# Patient Record
Sex: FEMALE | Race: WHITE
[De-identification: names, ages, dates, MRNs, and addresses within clinical notes are randomized per-mention and may not be internally consistent; named-entity substitution may affect disease eponyms.]

---

## 2019-03-05 ENCOUNTER — HOSPITAL ENCOUNTER (EMERGENCY)
Dept: HOSPITAL 97 - ER | Age: 34
Discharge: HOME | End: 2019-03-05
Payer: COMMERCIAL

## 2019-03-05 DIAGNOSIS — K59.00: ICD-10-CM

## 2019-03-05 DIAGNOSIS — N20.0: Primary | ICD-10-CM

## 2019-03-05 DIAGNOSIS — F17.210: ICD-10-CM

## 2019-03-05 LAB — UA COMPLETE W REFLEX CULTURE PNL UR: (no result)

## 2019-03-05 PROCEDURE — 96372 THER/PROPH/DIAG INJ SC/IM: CPT

## 2019-03-05 PROCEDURE — 76377 3D RENDER W/INTRP POSTPROCES: CPT

## 2019-03-05 PROCEDURE — 81015 MICROSCOPIC EXAM OF URINE: CPT

## 2019-03-05 PROCEDURE — 74176 CT ABD & PELVIS W/O CONTRAST: CPT

## 2019-03-05 PROCEDURE — 87088 URINE BACTERIA CULTURE: CPT

## 2019-03-05 PROCEDURE — 81003 URINALYSIS AUTO W/O SCOPE: CPT

## 2019-03-05 PROCEDURE — 99284 EMERGENCY DEPT VISIT MOD MDM: CPT

## 2019-03-05 PROCEDURE — 81025 URINE PREGNANCY TEST: CPT

## 2019-03-05 PROCEDURE — 87086 URINE CULTURE/COLONY COUNT: CPT

## 2019-03-05 NOTE — ER
Nurse's Notes                                                                                     

 [facility]                                                                

Name: Leticia Clemons                                                                               

Age: 33 yrs                                                                                       

Sex: Female                                                                                       

: 1985                                                                                   

MRN: [id number]                                                                                   

Arrival Date: 2019                                                                          

Time: 12:04                                                                                       

Account#: [id number]                                                                            

Bed 14                                                                                            

Private MD: Unknown, Unknown                                                                      

Diagnosis: Calculus of kidney;Constipation                                                        

                                                                                                  

Presentation:                                                                                     

                                                                                             

12:07 Presenting complaint: Patient states: burning with urination both before and after for  ch  

      the past 4-5 days. I tried AZO but it didn't help. Transition of care: patient was not      

      received from another setting of care. Onset of symptoms was 2018. Risk            

      Assessment: Do you want to hurt yourself or someone else? Patient reports no desire to      

      harm self or others. Initial Sepsis Screen: Does the patient meet any 2 criteria? No.       

      Patient's initial sepsis screen is negative. Does the patient have a suspected source       

      of infection? No. Patient's initial sepsis screen is negative. Care prior to arrival:       

      None.                                                                                       

12:07 Method Of Arrival: Ambulatory                                                             

12:07 Acuity: YASMIN 3                                                                           ch  

                                                                                                  

OB/GYN:                                                                                           

12:09 [facility] 2019                                                                              ch  

                                                                                                  

Historical:                                                                                       

- Allergies:                                                                                      

12:09 No Known Allergies;                                                                     ch  

- Home Meds:                                                                                      

12:09 None [Active];                                                                          ch  

- PMHx:                                                                                           

12:09 None;                                                                                   ch  

- PSHx:                                                                                           

12:09 None;                                                                                   ch  

                                                                                                  

- Immunization history:: Adult Immunizations up to date, Flu vaccine is up to date.               

- Social history:: Smoking status: Patient uses tobacco products, smokes one pack                 

  cigarettes per day. Patient/guardian denies using alcohol, street drugs.                        

- Ebola Screening: : Patient negative for fever greater than or equal to 101.5 degrees            

  Fahrenheit, and additional compatible Ebola Virus Disease symptoms Patient denies               

  exposure to infectious person Patient denies travel to an Ebola-affected area in the            

  21 days before illness onset No symptoms or risks identified at this time.                      

                                                                                                  

                                                                                                  

Screenin:15 Abuse screen: Denies threats or abuse. Nutritional screening: No deficits noted.        rb1 

      Tuberculosis screening: No symptoms or risk factors identified. Fall Risk None              

      identified.                                                                                 

                                                                                                  

Assessment:                                                                                       

12:15 General: Appears in no apparent distress. comfortable, slender, Behavior is calm,       rb1 

      cooperative. Pain: Complains of pain in left lower quadrant Pain currently is 9 out of      

      10 on a pain scale. Pain began 4-5 days ago. Neuro: Level of Consciousness is awake,        

      alert, obeys commands, Oriented to person, place, time, situation. Cardiovascular:          

      Capillary refill < 3 seconds is brisk in bilateral fingers. Respiratory: Airway is          

      patent Respiratory effort is even, unlabored, Respiratory pattern is regular,               

      symmetrical. GI: No signs and/or symptoms were reported involving the gastrointestinal      

      system. : Reports burning with urination, since x 4-5 days. Derm: Skin is pink, warm      

      \T\ dry.                                                                                    

13:15 Reassessment: Patient appears in no apparent distress at this time. No changes from     rb1 

      previously documented assessment.                                                           

13:55 Reassessment: Pt. requested pain medication; provider notified. Received order to       rb1 

      Toradol 60 mg IM x once.                                                                    

14:15 Reassessment: Patient appears in no apparent distress at this time. Patient and/or      rb1 

      family updated on plan of care and expected duration. Pain level reassessed. Patient is     

      alert, oriented x 3, equal unlabored respirations, skin warm/dry/pink.                      

15:05 Reassessment: Patient appears in no apparent distress at this time. Patient and/or      rb1 

      family updated on plan of care and expected duration. Pain level reassessed. Patient is     

      alert, oriented x 3, equal unlabored respirations, skin warm/dry/pink. Pain 7/10.           

                                                                                                  

Vital Signs:                                                                                      

12:09  / 97; Pulse 94; Resp 18; Temp 98.3; Pulse Ox 100% on R/A; Weight 45.36 kg;         

      Height 5 ft. 3 in. (160.02 cm); Pain 8/10;                                                  

13:30  / 86; Pulse 93; Resp 16; Pulse Ox 100% on R/A; Pain 9/10;                        rb1 

14:00  / 88; Pulse 86; Resp 16; Pulse Ox 100% ;                                         rb1 

15:00  / 90; Pulse 83; Resp 15; Pulse Ox 100% on R/A;                                   rb1 

12:09 Body Mass Index 17.71 (45.36 kg, 160.02 cm)                                               

                                                                                                  

ED Course:                                                                                        

12:04 Patient arrived in ED.                                                                  ag5 

12:04 Unknown, Unknown is Private Physician.                                                  ag5 

12:05 Valerie Lopez FNP-C is [facility].                                                        kb  

12:06 Patrick Ansari MD is Attending Physician.                                                kb  

12:08 Triage completed.                                                                         

12:15 Patient has correct armband on for positive identification. Bed in low position. Call   rb1 

      light in reach. Side rails up X 1. Pulse ox on. NIBP on. Warm blanket given.                

12:15 Arm band placed on right wrist.                                                         rb1 

12:52 Clementina Sanon, RN is Primary Nurse.                                                   rb1 

14:27 CT Stone Protocol In Process Unspecified.                                               EDMS

15:29 No provider procedures requiring assistance completed. Patient did not have IV access   rb1 

      during this emergency room visit.                                                           

                                                                                                  

Administered Medications:                                                                         

14:05 Drug: TORadol 60 mg Route: IM; Site: right gluteus;                                     rb1 

15:03 Follow up: Response: No adverse reaction; Pain is decreased; 7/10                       rb1 

                                                                                                  

                                                                                                  

Intake:                                                                                           

                                                                                                  

Outcome:                                                                                          

15:20 Discharge ordered by MD.                                                                kb  

15:29 Discharged to home ambulatory, with significant other.                                  rb1 

15:29 Condition: stable                                                                           

15:29 Discharge instructions given to patient, Instructed on discharge instructions, follow       

      up and referral plans. medication usage, Demonstrated understanding of instructions,        

      follow-up care, medications, Prescriptions given X 1.                                       

15:30 Patient left the ED.                                                                    rb1 

                                                                                                  

Signatures:                                                                                       

Dispatcher [employer]                           EDMS                                                 

Valerie Lopez, FNP-C                 FNP-Natalie Ba, RN                  RN                                                      

Clementina Sanon, RN                     RN   rb1                                                  

Tamar Lemus                                ag5                                                  

                                                                                                  

**************************************************************************************************

## 2019-03-05 NOTE — EDPHYS
Physician Documentation                                                                           

 [facility]                                                                

Name: Leticia Clemons                                                                               

Age: 33 yrs                                                                                       

Sex: Female                                                                                       

: 1985                                                                                   

MRN: [id number]                                                                                   

Arrival Date: 2019                                                                          

Time: 12:04                                                                                       

Account#: [id number]                                                                            

Bed 14                                                                                            

Private MD: Unknown, Unknown                                                                      

ED Physician Patrick Ansari                                                                         

HPI:                                                                                              

                                                                                             

14:42 This 33 yrs old  Female presents to ER via Ambulatory with complaints of       kb  

      UNABLE TO URINATE.                                                                          

14:43 The patient complains of pain in the left flank. The pain does not radiate. Onset: The  kb  

      symptoms/episode began/occurred 4 day(s) ago. Modifying factors: The symptoms are           

      alleviated by nothing. the symptoms are aggravated by palpation/percussion, urination.      

      Associated signs and symptoms: Pertinent positives: dysuria. Severity of pain: At its       

      worst the pain was moderate in the emergency department the pain is unchanged. The          

      patient has not experienced similar symptoms in the past. The patient has not recently      

      seen a physician.                                                                           

                                                                                                  

OB/GYN:                                                                                           

12:09 [facility] 2019                                                                                

                                                                                                  

Historical:                                                                                       

- Allergies:                                                                                      

12:09 No Known Allergies;                                                                     ch  

- Home Meds:                                                                                      

12:09 None [Active];                                                                          ch  

- PMHx:                                                                                           

12:09 None;                                                                                   ch  

- PSHx:                                                                                           

12:09 None;                                                                                   ch  

                                                                                                  

- Immunization history:: Adult Immunizations up to date, Flu vaccine is up to date.               

- Social history:: Smoking status: Patient uses tobacco products, smokes one pack                 

  cigarettes per day. Patient/guardian denies using alcohol, street drugs.                        

- Ebola Screening: : Patient negative for fever greater than or equal to 101.5 degrees            

  Fahrenheit, and additional compatible Ebola Virus Disease symptoms Patient denies               

  exposure to infectious person Patient denies travel to an Ebola-affected area in the            

  21 days before illness onset No symptoms or risks identified at this time.                      

                                                                                                  

                                                                                                  

ROS:                                                                                              

14:41 Constitutional: Negative for fever, chills, and weight loss, Cardiovascular: Negative   kb  

      for chest pain, palpitations, and edema, Respiratory: Negative for shortness of breath,     

      cough, wheezing, and pleuritic chest pain, Abdomen/GI: Negative for abdominal pain,         

      nausea, vomiting, diarrhea, and constipation, MS/Extremity: Negative for injury and         

      deformity, Skin: Negative for injury, rash, and discoloration, Neuro: Negative for          

      headache, weakness, numbness, tingling, and seizure.                                        

14:41 : Positive for flank pain, burning with urination.                                        

                                                                                                  

Exam:                                                                                             

14:41 Constitutional:  This is a well developed, well nourished patient who is awake, alert,  kb  

      and in no acute distress. Head/Face:  Normocephalic, atraumatic. Chest/axilla:  Normal      

      chest wall appearance and motion.  Nontender with no deformity.  No lesions are             

      appreciated. Cardiovascular:  Regular rate and rhythm with a normal S1 and S2.  No          

      gallops, murmurs, or rubs.  Normal PMI, no JVD.  No pulse deficits. Respiratory:  Lungs     

      have equal breath sounds bilaterally, clear to auscultation and percussion.  No rales,      

      rhonchi or wheezes noted.  No increased work of breathing, no retractions or nasal          

      flaring. Abdomen/GI:  Soft, non-tender, with normal bowel sounds.  No distension or         

      tympany.  No guarding or rebound.  No evidence of tenderness throughout. Skin:  Warm,       

      dry with normal turgor.  Normal color with no rashes, no lesions, and no evidence of        

      cellulitis. MS/ Extremity:  Pulses equal, no cyanosis.  Neurovascular intact.  Full,        

      normal range of motion. Neuro:  Awake and alert, GCS 15, oriented to person, place,         

      time, and situation.  Cranial nerves II-XII grossly intact.  Motor strength 5/5 in all      

      extremities.  Sensory grossly intact.  Cerebellar exam normal.  Normal gait.                

14:42 Back: CVA tenderness, that is mild, is noted on the left.                                 

                                                                                                  

Vital Signs:                                                                                      

12:09  / 97; Pulse 94; Resp 18; Temp 98.3; Pulse Ox 100% on R/A; Weight 45.36 kg;         

      Height 5 ft. 3 in. (160.02 cm); Pain 8/10;                                                  

13:30  / 86; Pulse 93; Resp 16; Pulse Ox 100% on R/A; Pain 9/10;                        rb1 

14:00  / 88; Pulse 86; Resp 16; Pulse Ox 100% ;                                         rb1 

15:00  / 90; Pulse 83; Resp 15; Pulse Ox 100% on R/A;                                   rb1 

12:09 Body Mass Index 17.71 (45.36 kg, 160.02 cm)                                               

                                                                                                  

MDM:                                                                                              

12:10 Patient medically screened.                                                             kb  

14:41 Data reviewed: vital signs, nurses notes. Data interpreted: Pulse oximetry: on room air kb  

      is 100 %. Interpretation: normal.                                                           

15:18 Counseling: I had a detailed discussion with the patient and/or guardian regarding: the kb  

      historical points, exam findings, and any diagnostic results supporting the                 

      discharge/admit diagnosis, lab results, radiology results, the need for outpatient          

      follow up, a family practitioner, to return to the emergency department if symptoms         

      worsen or persist or if there are any questions or concerns that arise at home.             

                                                                                                  

                                                                                             

12:10 Order name: Urine Microscopic Only; Complete Time: 14:41                                kb  

                                                                                             

12:25 Order name: Urine Dipstick--Ancillary (enter results); Complete Time: 13:13             ss  

                                                                                             

12:25 Order name: Urine Pregnancy--Ancillary (enter results); Complete Time: 13:13            ss  

                                                                                             

14:05 Order name: CT Stone Protocol; Complete Time: 15:04                                     kb  

                                                                                             

14:40 Order name: Urine Culture                                                               [facility]

                                                                                             

12:10 Order name: Urine Pregnancy Test (obtain specimen); Complete Time: 12:22                kb  

                                                                                             

12:10 Order name: Urine Dipstick-Ancillary (obtain specimen); Complete Time: 12:22            kb  

                                                                                                  

Administered Medications:                                                                         

14:05 Drug: TORadol 60 mg Route: IM; Site: right gluteus;                                     rb1 

15:03 Follow up: Response: No adverse reaction; Pain is decreased; 7/10                       rb1 

                                                                                                  

                                                                                                  

Disposition:                                                                                      

18:03 Co-signature as Attending Physician, Patrick Ansari MD.                                    rn  

                                                                                                  

Disposition:                                                                                      

19 15:20 Discharged to Home. Impression: Calculus of kidney, Constipation.                  

- Condition is Stable.                                                                            

- Discharge Instructions: Constipation, Adult, Easy-to-Read, Kidney Stones,                       

  Easy-to-Read.                                                                                   

- Prescriptions for Diclofenac Sodium 75 mg Oral Tablet, Delayed Release (E.C.) - take            

  1 tablet by ORAL route 2 times per day As needed; 30 tablet.                                    

- Medication Reconciliation Form, Thank You Letter, Antibiotic Education, Prescription            

  Opioid Use form.                                                                                

- Follow up: Emergency Department; When: As needed; Reason: Worsening of condition.               

  Follow up: Private Physician; When: 2 - 3 days; Reason: Recheck today's complaints,             

  Continuance of care, Re-evaluation by your physician.                                           

                                                                                                  

                                                                                                  

                                                                                                  

Signatures:                                                                                       

Dispatcher [employer]                           EDMS                                                 

Valerie Lopez, JOSIAHP-C                 ABA-Ckb                                                   

Coleman, CANDICE Estrada RN, ch, Roman, MD MD rn Barber, Rebecca, RN                     RN   rb1                                                  

                                                                                                  

Corrections: (The following items were deleted from the chart)                                    

14:42 14:41 Constitutional: This is a well developed, well nourished patient who is awake,    kb  

      alert, and in no acute distress. Head/Face: Normocephalic, atraumatic. Chest/axilla:        

      Normal chest wall appearance and motion. Nontender with no deformity. No lesions are        

      appreciated. Cardiovascular: Regular rate and rhythm with a normal S1 and S2. No            

      gallops, murmurs, or rubs. Normal PMI, no JVD. No pulse deficits. Respiratory: Lungs        

      have equal breath sounds bilaterally, clear to auscultation and percussion. No rales,       

      rhonchi or wheezes noted. No increased work of breathing, no retractions or nasal           

      flaring. Abdomen/GI: Soft, non-tender, with normal bowel sounds. No distension or           

      tympany. No guarding or rebound. No evidence of tenderness throughout. Skin: Warm, dry      

      with normal turgor. Normal color with no rashes, no lesions, and no evidence of             

      cellulitis. MS/ Extremity: Pulses equal, no cyanosis. Neurovascular intact. Full,           

      normal range of motion. Neuro: Awake and alert, GCS 15, oriented to person, place,          

      time, and situation. Cranial nerves II-XII grossly intact. Motor strength 5/5 in all        

      extremities. Sensory grossly intact. Cerebellar exam normal. Normal gait. kb                

15:30 15:20 2019 15:20 Discharged to Home. Impression: Calculus of kidney;              rb1 

      Constipation. Condition is Stable. Forms are Medication Reconciliation Form, Thank You      

      Letter, Antibiotic Education, Prescription Opioid Use. Follow up: Emergency Department;     

      When: As needed; Reason: Worsening of condition. Follow up: Private Physician; When: 2      

      - 3 days; Reason: Recheck today's complaints, Continuance of care, Re-evaluation by         

      your physician. kb                                                                          

                                                                                                  

**************************************************************************************************